# Patient Record
(demographics unavailable — no encounter records)

---

## 2025-04-18 NOTE — CARDIOLOGY SUMMARY
[Today's Date] : [unfilled] [FreeTextEntry1] : For F/H of ASD Normal sinus rhythm, normal QRS axis, normal intervals (QTc 431 msec), no hypertrophy, no pre-excitation, no ST segment or T wave abnormalities. Normal EKG for age.  [FreeTextEntry2] : Normal intracardiac anatomy.  LV dimensions and shortening fraction were normal.  No pericardial effusion.

## 2025-04-18 NOTE — HISTORY OF PRESENT ILLNESS
[FreeTextEntry1] : I had the pleasure of seeing URMILA in the cardiology office for follow-up. As you know, URMILA is a 1-month-old female with history of maternal SSB antibodies, diagnosed with a family history of ASD in her first cousin. The baby has been thriving at home, has been feeding without difficulty, and has been gaining weight and developing appropriately. There has been no tachypnea, increased work of breathing, cyanosis, diaphoresis, unexplained irritability, or syncope.

## 2025-04-18 NOTE — REVIEW OF SYSTEMS
[Nl] : no feeding issues at this time. [Breastmilk] : Breastmilk ~M [___ ounces/feeding] : ~SHANI cardenas/feeding [___ Times/day] : [unfilled] times/day [Acting Fussy] : not acting ~L fussy [Fever] : no fever [Wgt Loss (___ Lbs)] : no recent weight loss [Pallor] : not pale [Discharge] : no discharge [Redness] : no redness [Nasal Discharge] : no nasal discharge [Nasal Stuffiness] : no nasal congestion [Stridor] : no stridor [Cyanosis] : no cyanosis [Edema] : no edema [Diaphoresis] : not diaphoretic [Tachypnea] : not tachypneic [Wheezing] : no wheezing [Cough] : no cough [Being A Poor Eater] : not a poor eater [Vomiting] : no vomiting [Diarrhea] : no diarrhea [Decrease In Appetite] : appetite not decreased [Fainting (Syncope)] : no fainting [Dec Consciousness] :  no decrease in consciousness [Seizure] : no seizures [Hypotonicity (Flaccid)] : not hypotonic [Refusal to Bear Wgt] : normal weight bearing [Puffy Hands/Feet] : no hand/feet puffiness [Rash] : no rash [Hemangioma] : no hemangioma [Jaundice] : no jaundice [Wound problems] : no wound problems [Bruising] : no tendency for easy bruising [Swollen Glands] : no lymphadenopathy [Enlarged Macdoel] : the fontanelle was not enlarged [Hoarse Cry] : no hoarse cry [Failure To Thrive] : no failure to thrive [Vaginal Discharge] : no vaginal discharge [Ambiguous Genitals] : genitals not ambiguous [Dec Urine Output] : no oliguria [Solid Foods] : No solid food at this time

## 2025-04-18 NOTE — CONSULT LETTER
[Today's Date] : [unfilled] [Name] : Name: [unfilled] [] : : ~~ [Today's Date:] : [unfilled] [Dear  ___:] : Dear Dr. [unfilled]: [Consult] : I had the pleasure of evaluating your patient, [unfilled]. My full evaluation follows. [Consult - Single Provider] : Thank you very much for allowing me to participate in the care of this patient. If you have any questions, please do not hesitate to contact me. [Sincerely,] : Sincerely, [FreeTextEntry4] : Lani Alonso MD [FreeTextEntry5] : [address] [FreeTextEntry6] : [place] [id number] [de-identified] : Crow Ray MD, FACC, ANDREY, FAAP Pediatric Cardiologist [facility]

## 2025-04-18 NOTE — PAST MEDICAL HISTORY
[At Term] : at term [Birth Weight:___] : [unfilled] weighed [unfilled] at birth. [Normal Vaginal Route] : by normal vaginal route [None] : No delivery complications [Diabetes Mellitus] : diabetes mellitus [de-identified] : sjogren's

## 2025-04-18 NOTE — DISCUSSION/SUMMARY
[FreeTextEntry1] : In summary, URMILA is a 1-month female referred to cardiology due to a family history of ASD in her first cousin.   She has a patent foramen ovale, which is normal at this age and may close spontaneously. We discussed that 20% of individuals continue to have a PFO. I discussed at length with the family the results of the echocardiogram, and that all future children should also be screened for congenital heart disease/cardiomyopathy.  The family verbalized understanding, and all questions were answered.  Further cardiology follow-up is as clinically indicated in the future.